# Patient Record
Sex: MALE | Race: WHITE | Employment: UNEMPLOYED | URBAN - METROPOLITAN AREA
[De-identification: names, ages, dates, MRNs, and addresses within clinical notes are randomized per-mention and may not be internally consistent; named-entity substitution may affect disease eponyms.]

---
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
## 2024-12-14 NOTE — PROGRESS NOTES
Reassessed patient for active suicidal thoughts and intent, and he reported that he does not have a plan for while he is,  here however, he has plans for when he is home. Some of his thoughts and plan include; jumping from a tall building, using pills to overdose and also use a knife. Patient denies intent and plan while here. He also denied to this writer an attempt of suicide prior to being admitted.    100